# Patient Record
Sex: MALE | Race: WHITE | NOT HISPANIC OR LATINO | Employment: UNEMPLOYED | URBAN - METROPOLITAN AREA
[De-identification: names, ages, dates, MRNs, and addresses within clinical notes are randomized per-mention and may not be internally consistent; named-entity substitution may affect disease eponyms.]

---

## 2018-01-21 ENCOUNTER — APPOINTMENT (OUTPATIENT)
Dept: GENERAL RADIOLOGY | Facility: HOSPITAL | Age: 38
End: 2018-01-21

## 2018-01-21 ENCOUNTER — HOSPITAL ENCOUNTER (EMERGENCY)
Facility: HOSPITAL | Age: 38
Discharge: HOME | End: 2018-01-21
Attending: EMERGENCY MEDICINE | Admitting: EMERGENCY MEDICINE

## 2018-01-21 VITALS
OXYGEN SATURATION: 100 % | TEMPERATURE: 97.5 F | HEIGHT: 72 IN | BODY MASS INDEX: 29.8 KG/M2 | DIASTOLIC BLOOD PRESSURE: 88 MMHG | WEIGHT: 220 LBS | SYSTOLIC BLOOD PRESSURE: 142 MMHG | HEART RATE: 72 BPM | RESPIRATION RATE: 20 BRPM

## 2018-01-21 DIAGNOSIS — B34.9 ACUTE VIRAL SYNDROME: Primary | ICD-10-CM

## 2018-01-21 LAB
FLUAV AG NPH QL: NEGATIVE
FLUBV AG NPH QL IA: NEGATIVE

## 2018-01-21 PROCEDURE — 71046 X-RAY EXAM CHEST 2 VIEWS: CPT

## 2018-01-21 PROCEDURE — 99283 EMERGENCY DEPT VISIT LOW MDM: CPT

## 2018-01-21 PROCEDURE — 87804 INFLUENZA ASSAY W/OPTIC: CPT | Performed by: EMERGENCY MEDICINE

## 2018-01-21 RX ORDER — METHYLPREDNISOLONE 4 MG/1
TABLET ORAL
Qty: 21 TABLET | Refills: 0 | Status: SHIPPED | OUTPATIENT
Start: 2018-01-21

## 2018-01-21 NOTE — ED PROVIDER NOTES
EMERGENCY DEPARTMENT ENCOUNTER    CHIEF COMPLAINT  Chief Complaint: flu-like symptoms  History given by: pt   History limited by: none   Room Number: 14/14  PMD: No Known Provider      HPI:  Pt is a 37 y.o. male who presents complaining of flu-like symptoms for the past 5 days. Pt also c/o generalized weakness, lowered PO intake, intermittent fever up to 101, generalized myalgias, cough, and nausea, as well as sinus congestion for the past month. Pt states that he had been taking various OTC medications without relief.     Duration:  5 days  Onset: gradual   Timing: constant   Quality: flu-like symptoms  Intensity/Severity: moderate   Progression: unchanged   Associated Symptoms: generalized weakness, lowered PO intake, fever, generalized myalgias, cough, nausea, sinus congestion  Aggravating Factors: none stated   Alleviating Factors: none stated   Previous Episodes: none stated  Treatment before arrival: several OTC medications    PAST MEDICAL HISTORY  Active Ambulatory Problems     Diagnosis Date Noted   • No Active Ambulatory Problems     Resolved Ambulatory Problems     Diagnosis Date Noted   • No Resolved Ambulatory Problems     Past Medical History:   Diagnosis Date   • Drug abuse        PAST SURGICAL HISTORY  History reviewed. No pertinent surgical history.    FAMILY HISTORY  History reviewed. No pertinent family history.    SOCIAL HISTORY  Social History     Social History   • Marital status: Single     Spouse name: N/A   • Number of children: N/A   • Years of education: N/A     Occupational History   • Not on file.     Social History Main Topics   • Smoking status: Former Smoker     Quit date: 1/21/2003   • Smokeless tobacco: Never Used   • Alcohol use No   • Drug use: No   • Sexual activity: Not on file     Other Topics Concern   • Not on file     Social History Narrative   • No narrative on file       ALLERGIES  Review of patient's allergies indicates no known allergies.    REVIEW OF SYSTEMS  Review of  Systems   Constitutional: Positive for fever (intermittent). Negative for activity change and appetite change.   HENT: Positive for congestion. Negative for sore throat.    Eyes: Negative.    Respiratory: Positive for cough. Negative for shortness of breath.    Cardiovascular: Negative for chest pain and leg swelling.   Gastrointestinal: Positive for nausea. Negative for abdominal pain, diarrhea and vomiting.   Endocrine: Negative.    Genitourinary: Negative for decreased urine volume and dysuria.   Musculoskeletal: Positive for myalgias (generalized). Negative for neck pain.   Skin: Negative for rash and wound.   Allergic/Immunologic: Negative.    Neurological: Positive for weakness (generalized). Negative for numbness and headaches.   Hematological: Negative.    Psychiatric/Behavioral: Negative.    All other systems reviewed and are negative.      PHYSICAL EXAM  ED Triage Vitals   Temp Heart Rate Resp BP SpO2   01/21/18 1040 01/21/18 1040 01/21/18 1046 01/21/18 1046 01/21/18 1040   97.6 °F (36.4 °C) 72 18 126/92 100 %      Temp src Heart Rate Source Patient Position BP Location FiO2 (%)   01/21/18 1040 01/21/18 1040 01/21/18 1046 01/21/18 1046 --   Oral Monitor Lying Right arm          Physical Exam   Constitutional: He is oriented to person, place, and time and well-developed, well-nourished, and in no distress.   HENT:   Head: Normocephalic and atraumatic.   Right Ear: Tympanic membrane normal.   Left Ear: Tympanic membrane normal.   Mouth/Throat: No posterior oropharyngeal erythema.   Sinuses nontender   Eyes: EOM are normal. Pupils are equal, round, and reactive to light.   Neck: Normal range of motion. Neck supple.   Cardiovascular: Normal rate, regular rhythm and normal heart sounds.    Pulmonary/Chest: Effort normal and breath sounds normal. No respiratory distress.   Abdominal: Soft. There is no tenderness. There is no rebound and no guarding.   Musculoskeletal: Normal range of motion. He exhibits no  edema.   Lymphadenopathy:     He has no cervical adenopathy.   Neurological: He is alert and oriented to person, place, and time. He has normal sensation and normal strength.   Skin: Skin is warm and dry.   Psychiatric: Mood and affect normal.   Nursing note and vitals reviewed.      LAB RESULTS  Lab Results (last 24 hours)     Procedure Component Value Units Date/Time    Influenza Antigen, Rapid - Swab, Nasopharynx [[id number]]  (Normal) Collected:  01/21/18 1155    Specimen:  Swab from Nasopharynx Updated:  01/21/18 1221     Influenza A Ag, EIA Negative     Influenza B Ag, EIA Negative          I ordered the above labs and reviewed the results    RADIOLOGY  XR Chest 2 View   Final Result   No evidence for acute pulmonary process. Follow-up as   clinical indications persist.       This report was finalized on 1/21/2018 11:14 AM by Dr. Shady Clark MD.               I ordered the above noted radiological studies. Interpreted by radiologist. Reviewed by me in PACS.       PROCEDURES  PROGRESS AND CONSULTS  Procedures      ED Course   1101  Ordered labs and CXR for further evaluation.   1234  Rechecked pt, who is resting comfortably. Discussed the pt's labs with no findings of flu, and CXR with no finding of pneumonia. Advised the pt on OTC medications to use. Plan to d/c the pt on steroids. Pt understands and agrees with the plan, and all questions were answered.      MEDICAL DECISION MAKING  Results were reviewed/discussed with the patient and they were also made aware of online access. Pt also made aware that some labs, such as cultures, will not be resulted during ER visit and follow up with PMD is necessary.     MDM  Number of Diagnoses or Management Options  Acute viral syndrome:      Amount and/or Complexity of Data Reviewed  Clinical lab tests: reviewed and ordered (Flu swab: negative)  Tests in the radiology section of CPT®: reviewed and ordered (CXR: negative)    Patient Progress  Patient progress:  stable         DIAGNOSIS  Final diagnoses:   Acute viral syndrome       DISPOSITION  DISCHARGE    Patient discharged in stable condition.    Reviewed implications of results, diagnosis, meds, responsibility to follow up, warning signs and symptoms of possible worsening, potential complications and reasons to return to ER.    Patient/Family voiced understanding of above instructions.    Discussed plan for discharge, as there is no emergent indication for admission.  Pt/family is agreeable and understands need for follow up and repeat testing.  Pt is aware that discharge does not mean that nothing is wrong but it indicates no emergency is present that requires admission and they must continue care with follow-up as given below or physician of their choice.     FOLLOW-UP  [facility]ERR[facility]  [facility] [id number]  [phone]  Schedule an appointment as soon as possible for a visit           Medication List      New Prescriptions          MethylPREDNISolone 4 MG tablet   Commonly known as:  MEDROL (MARYAM)   Take as directed on package instructions.               Latest Documented Vital Signs:  As of 3:26 PM  BP- 142/88 HR- 72 Temp- 97.5 °F (36.4 °C) (Oral) O2 sat- 100%    --  Documentation assistance provided by waylon Stephenson for Dr. Lee.  Information recorded by the waylon was done at my direction and has been verified and validated by me.       Audi Stephenson  01/21/18 0897       Shantanu Lee MD  01/21/18 8255

## 2018-01-21 NOTE — DISCHARGE INSTRUCTIONS
Your symptoms can last 10 to 14 days.  Use over the counter Mucinex for congestion and Tylenol or Ibuprofen as needed for aches, pains or fever.  Please return to the ED if you develop trouble breathing or increasing weakness with fever.